# Patient Record
Sex: FEMALE | Race: WHITE | NOT HISPANIC OR LATINO | Employment: OTHER | URBAN - METROPOLITAN AREA
[De-identification: names, ages, dates, MRNs, and addresses within clinical notes are randomized per-mention and may not be internally consistent; named-entity substitution may affect disease eponyms.]

---

## 2023-02-08 ENCOUNTER — OFFICE VISIT (OUTPATIENT)
Dept: PODIATRY | Facility: CLINIC | Age: 88
End: 2023-02-08
Payer: COMMERCIAL

## 2023-02-08 VITALS
HEIGHT: 67 IN | DIASTOLIC BLOOD PRESSURE: 84 MMHG | SYSTOLIC BLOOD PRESSURE: 132 MMHG | BODY MASS INDEX: 30.92 KG/M2 | WEIGHT: 197 LBS | RESPIRATION RATE: 18 BRPM | HEART RATE: 76 BPM

## 2023-02-08 DIAGNOSIS — L03.031 SUBUNGUAL ABSCESS OF TOE, RIGHT: Primary | ICD-10-CM

## 2023-02-08 DIAGNOSIS — L03.031 CELLULITIS OF GREAT TOE OF RIGHT FOOT: ICD-10-CM

## 2023-02-08 PROCEDURE — 99203 PR OFFICE/OUTPT VISIT, NEW, LEVL III, 30-44 MIN: ICD-10-PCS | Mod: S$GLB,,, | Performed by: PODIATRIST

## 2023-02-08 PROCEDURE — 99999 PR PBB SHADOW E&M-NEW PATIENT-LVL IV: CPT | Mod: PBBFAC,,, | Performed by: PODIATRIST

## 2023-02-08 PROCEDURE — 99203 OFFICE O/P NEW LOW 30 MIN: CPT | Mod: S$GLB,,, | Performed by: PODIATRIST

## 2023-02-08 PROCEDURE — 99999 PR PBB SHADOW E&M-NEW PATIENT-LVL IV: ICD-10-PCS | Mod: PBBFAC,,, | Performed by: PODIATRIST

## 2023-02-08 RX ORDER — LANOLIN ALCOHOL/MO/W.PET/CERES
1000 CREAM (GRAM) TOPICAL DAILY
COMMUNITY
Start: 2022-10-05

## 2023-02-08 RX ORDER — HYDROCHLOROTHIAZIDE 12.5 MG/1
12.5 TABLET ORAL DAILY
COMMUNITY
Start: 2022-11-21

## 2023-02-08 RX ORDER — OLMESARTAN MEDOXOMIL 40 MG/1
40 TABLET ORAL DAILY
COMMUNITY
Start: 2023-01-27

## 2023-02-08 RX ORDER — ATORVASTATIN CALCIUM 40 MG/1
40 TABLET, FILM COATED ORAL DAILY
COMMUNITY
Start: 2023-01-27

## 2023-02-08 RX ORDER — CHOLECALCIFEROL (VITAMIN D3) 25 MCG
1000 TABLET ORAL DAILY
COMMUNITY

## 2023-02-08 RX ORDER — DOXYCYCLINE 100 MG/1
100 CAPSULE ORAL EVERY 12 HOURS
Qty: 20 CAPSULE | Refills: 0 | Status: SHIPPED | OUTPATIENT
Start: 2023-02-08 | End: 2023-02-09 | Stop reason: ALTCHOICE

## 2023-02-08 RX ORDER — AZITHROMYCIN 250 MG/1
1 TABLET, FILM COATED ORAL DAILY
COMMUNITY
Start: 2023-01-23 | End: 2023-02-15

## 2023-02-08 RX ORDER — METOPROLOL SUCCINATE 25 MG/1
25 TABLET, EXTENDED RELEASE ORAL DAILY
COMMUNITY
Start: 2022-11-18

## 2023-02-08 RX ORDER — PSEUDOEPHED/CODEINE/TRIPROLIDN 30-10-1.25
1 SYRUP ORAL DAILY
COMMUNITY

## 2023-02-08 RX ORDER — NAPROXEN 500 MG/1
500 TABLET ORAL 2 TIMES DAILY PRN
COMMUNITY
Start: 2022-10-05

## 2023-02-09 ENCOUNTER — TELEPHONE (OUTPATIENT)
Dept: PODIATRY | Facility: CLINIC | Age: 88
End: 2023-02-09
Payer: COMMERCIAL

## 2023-02-09 DIAGNOSIS — L03.031 CELLULITIS OF GREAT TOE OF RIGHT FOOT: Primary | ICD-10-CM

## 2023-02-09 RX ORDER — SULFAMETHOXAZOLE AND TRIMETHOPRIM 800; 160 MG/1; MG/1
1 TABLET ORAL 2 TIMES DAILY
Qty: 20 TABLET | Refills: 0 | Status: SHIPPED | OUTPATIENT
Start: 2023-02-09 | End: 2023-02-19

## 2023-02-09 NOTE — TELEPHONE ENCOUNTER
Returned patient call back and left another message stating that antibiotic had been changed and called into pharmacy.

## 2023-02-09 NOTE — PROGRESS NOTES
Subjective:       Patient ID: Tiera Ryan is a 87 y.o. female.    Chief Complaint: Abscess (Toe)  Patient presents with concern regarding infection of the right great toe.  Relates about 3 weeks ago she wore a new pair shoes for 1 day.  She felt pressure on the toenail but really was not painful.  When she removed the shoes and looked at her toe after dinner the nail was discolored, lifted up some swelling and redness of the skin.  She denies any damage or fungal involvement of the nail previously, states this nail the same as her left big toenail.  She denies injury.  Has not really performed any treatment since it occurred, is getting worse.  Sore when in her shoes but not really painful    Past Medical History:   Diagnosis Date    Hypertension      Past Surgical History:   Procedure Laterality Date    TONSILLECTOMY       History reviewed. No pertinent family history.  Social History     Socioeconomic History    Marital status:    Tobacco Use    Smoking status: Never    Smokeless tobacco: Never   Substance and Sexual Activity    Alcohol use: Not Currently    Drug use: Never    Sexual activity: Not Currently       Current Outpatient Medications   Medication Sig Dispense Refill    atorvastatin (LIPITOR) 40 MG tablet Take 40 mg by mouth Daily.      calcium carbonate 650 mg calcium (1,625 mg) tablet Take 1 tablet by mouth once daily.      cyanocobalamin (VITAMIN B-12) 1000 MCG tablet Take 1,000 mcg by mouth Daily.      metoprolol succinate (TOPROL-XL) 25 MG 24 hr tablet Take 25 mg by mouth Daily.      olmesartan (BENICAR) 40 MG tablet Take 40 mg by mouth Daily.      vitamin D (VITAMIN D3) 1000 units Tab Take 1,000 Units by mouth once daily.      azithromycin (Z-YVONNE) 250 MG tablet Take 1 tablet by mouth Daily.      doxycycline (VIBRAMYCIN) 100 MG Cap Take 1 capsule (100 mg total) by mouth every 12 (twelve) hours. for 10 days 20 capsule 0    hydroCHLOROthiazide (HYDRODIURIL) 12.5 MG Tab Take 12.5 mg by mouth  "Daily.      naproxen (NAPROSYN) 500 MG tablet Take 500 mg by mouth 2 (two) times daily as needed.       No current facility-administered medications for this visit.     Review of patient's allergies indicates:   Allergen Reactions    Fluticasone Other (See Comments)     IRRITATES       Review of Systems   HENT:  Negative for congestion.    Respiratory:  Negative for cough.    Cardiovascular:  Negative for leg swelling.   Musculoskeletal:  Negative for gait problem.   All other systems reviewed and are negative.    Objective:      Vitals:    02/08/23 1129   BP: 132/84   Pulse: 76   Resp: 18   Weight: 89.4 kg (197 lb)   Height: 5' 7" (1.702 m)     Physical Exam  Vitals and nursing note reviewed.   Constitutional:       General: She is not in acute distress.     Appearance: Normal appearance.   Cardiovascular:      Pulses:           Dorsalis pedis pulses are 2+ on the right side and 2+ on the left side.        Posterior tibial pulses are 1+ on the right side and 1+ on the left side.   Pulmonary:      Effort: Pulmonary effort is normal.   Musculoskeletal:      Right foot: No deformity.      Left foot: No deformity.   Feet:      Right foot:      Skin integrity: Skin breakdown and erythema (Subungual abscess right hallux, positive erythema, calor, dystrophic nail) present.      Toenail Condition: Right toenails are abnormally thick.      Left foot:      Skin integrity: Skin integrity normal.   Skin:     Capillary Refill: Capillary refill takes 2 to 3 seconds.      Findings: Erythema present.      Comments: Cellulitis right hallux   Neurological:      General: No focal deficit present.      Mental Status: She is alert.   Psychiatric:         Mood and Affect: Mood normal.         Behavior: Behavior normal.         Thought Content: Thought content normal.         Judgment: Judgment normal.                      Assessment:       1. Subungual abscess of toe, right    2. Cellulitis of great toe of right foot        Plan:     "     Reviewed with patient need to start oral antibiotic due to cellulitis of the toe.  We discussed abscess under the nail which commonly develops due to repetitive pressure on the toenail.  Advised patient the nail may have been slightly damaged her lose, repetitive pressure causes damage to the nail bed, fluid starts leaking from skin break and develops an abscess which can not during.  Explained infection commonly spreads to the surrounding skin  Was able to debride good portion of the nail which is detached with a significant amount of maceration.  No purulence drainage but underlying skin was so moist it was difficult to identify location of abscess.  Nail was still intact on the medial aspect and under the cuticle  Area was cleansed with wound , iodine, gauze and Coban lightly applied  Showed patient how to apply same bandage daily, samples dispensed  She is to apply this bandage after soaking warm water and Epson salt for about 10-15 minutes, dry well, then apply iodine, gauze and light Coban.  In the evening she can remove the dressing allow the area to air out as long as a clean environment comfortable.  Can wear a clean cotton sock to bed only to keep it dry but covered  Explained needs to be covered when in shoe during the day  Reviewed progressing signs of infection to monitor for  Started on doxycycline, discussed antibiotic, start taken today as directed  Patient was in understanding and agreement with treatment plan.  I counseled the patient on their conditions, implications and medical management.  Instructed patient/family to contact the office with any changes, questions, concerns, worsening of symptoms.   Total face to face time 30 minutes, exam, assessment, treatment, discussion, additional time for review of chart prior to and following appointment and visit documentation, consultation and coordination of care.    Follow up 1 week    This note was created using [employer] voice recognition  software that occasionally misinterpreted phrases or words.

## 2023-02-09 NOTE — TELEPHONE ENCOUNTER
LVM regarding change in antibiotic therapy.----- Message from Mellisa Guerrero DPM sent at 2/9/2023  3:10 PM CST -----  I called in Bactrim, start it tomorrow, thank you    ----- Message -----  From: Sharron Goyal LPN  Sent: 2/9/2023   1:48 PM CST  To: Mellisa Guerrero DPM    Please advise.    Thank  you.  ----- Message -----  From: Seth Jc  Sent: 2/9/2023   1:45 PM CST  To: [facility] Staff    Who Called: patient         What is the reqeust in detail: Requesting call back to discuss new prescription,  pt can't take bc pt felt bad, got diarrhea, lots of mucus in throat and nose and dizzy, she had 2 pills one last night and one this morning.      doxycycline (VIBRAMYCIN) 100 MG Cap 20 capsule 0 2/8/2023 2/18/2023 No  Sig - Route: Take 1 capsule (100 mg total) by mouth every 12 (twelve) hours. for 10 days - Oral  Sent to pharmacy as: doxycycline (VIBRAMYCIN) 100 MG Cap  Class: Normal        Can the clinic reply by [facility]? no         Best Call Back Number: [phone]            Additional Information:

## 2023-02-15 ENCOUNTER — OFFICE VISIT (OUTPATIENT)
Dept: PODIATRY | Facility: CLINIC | Age: 88
End: 2023-02-15
Payer: COMMERCIAL

## 2023-02-15 VITALS
HEIGHT: 67 IN | HEART RATE: 75 BPM | DIASTOLIC BLOOD PRESSURE: 74 MMHG | BODY MASS INDEX: 30.92 KG/M2 | WEIGHT: 197 LBS | RESPIRATION RATE: 16 BRPM | SYSTOLIC BLOOD PRESSURE: 116 MMHG

## 2023-02-15 DIAGNOSIS — L03.031 CELLULITIS OF GREAT TOE OF RIGHT FOOT: Primary | ICD-10-CM

## 2023-02-15 DIAGNOSIS — L03.031 SUBUNGUAL ABSCESS OF TOE, RIGHT: ICD-10-CM

## 2023-02-15 PROCEDURE — 99999 PR PBB SHADOW E&M-EST. PATIENT-LVL III: CPT | Mod: PBBFAC,,, | Performed by: PODIATRIST

## 2023-02-15 PROCEDURE — 99213 PR OFFICE/OUTPT VISIT, EST, LEVL III, 20-29 MIN: ICD-10-PCS | Mod: S$GLB,,, | Performed by: PODIATRIST

## 2023-02-15 PROCEDURE — 99213 OFFICE O/P EST LOW 20 MIN: CPT | Mod: S$GLB,,, | Performed by: PODIATRIST

## 2023-02-15 PROCEDURE — 99999 PR PBB SHADOW E&M-EST. PATIENT-LVL III: ICD-10-PCS | Mod: PBBFAC,,, | Performed by: PODIATRIST

## 2023-02-15 NOTE — PROGRESS NOTES
Subjective:       Patient ID: Tiera Ryan is a 87 y.o. female.    Chief Complaint: Follow-up and Abscess  Patient presents for follow-up cellulitis due to subungual abscess right great toe.  Patient relates compliance with daily soaking and applying iodine.  Does relate area is improved, drier, however there is a soft at the cuticle which is tender and can only wear 1 pair of wide shoes.  Denies drainage, redness and swelling have improved, no pain unless with pressure along the cuticle       Past Medical History:   Diagnosis Date    Hypertension      Past Surgical History:   Procedure Laterality Date    TONSILLECTOMY       History reviewed. No pertinent family history.  Social History     Socioeconomic History    Marital status:    Tobacco Use    Smoking status: Never    Smokeless tobacco: Never   Substance and Sexual Activity    Alcohol use: Not Currently    Drug use: Never    Sexual activity: Not Currently       Current Outpatient Medications   Medication Sig Dispense Refill    atorvastatin (LIPITOR) 40 MG tablet Take 40 mg by mouth Daily.      calcium carbonate 650 mg calcium (1,625 mg) tablet Take 1 tablet by mouth once daily.      cyanocobalamin (VITAMIN B-12) 1000 MCG tablet Take 1,000 mcg by mouth Daily.      hydroCHLOROthiazide (HYDRODIURIL) 12.5 MG Tab Take 12.5 mg by mouth Daily.      metoprolol succinate (TOPROL-XL) 25 MG 24 hr tablet Take 25 mg by mouth Daily.      naproxen (NAPROSYN) 500 MG tablet Take 500 mg by mouth 2 (two) times daily as needed.      olmesartan (BENICAR) 40 MG tablet Take 40 mg by mouth Daily.      sulfamethoxazole-trimethoprim 800-160mg (BACTRIM DS) 800-160 mg Tab Take 1 tablet by mouth 2 (two) times daily. for 10 days 20 tablet 0    vitamin D (VITAMIN D3) 1000 units Tab Take 1,000 Units by mouth once daily.       No current facility-administered medications for this visit.     Review of patient's allergies indicates:   Allergen Reactions    Doxycycline hcl Diarrhea      "dizzy    Fluticasone Other (See Comments)     IRRITATES       Review of Systems   HENT:  Negative for congestion.    Respiratory:  Negative for cough.    Cardiovascular:  Negative for leg swelling.   Musculoskeletal:  Negative for gait problem.   All other systems reviewed and are negative.    Objective:      Vitals:    02/15/23 0921   BP: 116/74   Pulse: 75   Resp: 16   Weight: 89.4 kg (197 lb)   Height: 5' 7" (1.702 m)     Physical Exam  Vitals and nursing note reviewed.   Constitutional:       General: She is not in acute distress.     Appearance: Normal appearance.   Cardiovascular:      Pulses:           Dorsalis pedis pulses are 2+ on the right side and 2+ on the left side.        Posterior tibial pulses are 1+ on the right side and 1+ on the left side.   Pulmonary:      Effort: Pulmonary effort is normal.   Musculoskeletal:      Right foot: Bunion present.      Left foot: Bunion present.   Feet:      Right foot:      Skin integrity: Erythema (Significant decrease in erythema and edema with no calor right hallux, cellulitis much improved.  There is a lot of debris on the nail bed, thick, discolored/dried blood with some runny remaining nail along the cuticle and the lateral aspect) present. No skin breakdown (Center cuticle is soft and tender with no skin opening, ulcer or evidence of abscess remaining.  Damage to the nail bed).      Left foot:      Skin integrity: Skin integrity normal.   Skin:     Capillary Refill: Capillary refill takes 2 to 3 seconds.      Comments: Ce   Neurological:      General: No focal deficit present.      Mental Status: She is alert.   Psychiatric:         Mood and Affect: Mood normal.         Behavior: Behavior normal.         Thought Content: Thought content normal.         Judgment: Judgment normal.                      Assessment:       1. Cellulitis of great toe of right foot    2. Subungual abscess of toe, right          Plan:         Thickness nail bed was smoothed/debrided. " Nail bed is dry, lot of the discoloration was removed through debridement  Discussed damage to the nail bed.  Pointed out area which is soft at the cuticle is the location of previous abscess, it will take a while for skin in this location to grow out  Instructed soaking and application of iodine to this location needs to be continued daily until this area is completely dry, firm and pain-free.  At that point she can discontinue both.    Showed how to reduce thickness along the nail bed using nail file or emery board once a week, gently keeping the nail bed smooth, however it will take a long time for this to grow out and it is highly likely a damaged nail may grow back in.  Monitor for any changes.  We reviewed signs of infection to monitor for, contact office with any concerns prior to follow-up  Patient was in understanding and agreement with treatment plan.  I counseled the patient on their conditions, implications and medical management.  Instructed patient/family to contact the office with any changes, questions, concerns, worsening of symptoms.   Total face to face time 20 minutes, exam, assessment, treatment, discussion, additional time for review of chart prior to and following appointment and visit documentation, consultation and coordination of care.    Follow up 6 weeks    This note was created using [employer] voice recognition software that occasionally misinterpreted phrases or words.

## 2023-04-19 ENCOUNTER — OFFICE VISIT (OUTPATIENT)
Dept: PODIATRY | Facility: CLINIC | Age: 88
End: 2023-04-19
Payer: COMMERCIAL

## 2023-04-19 VITALS
HEIGHT: 67 IN | RESPIRATION RATE: 16 BRPM | BODY MASS INDEX: 29.51 KG/M2 | HEART RATE: 67 BPM | SYSTOLIC BLOOD PRESSURE: 151 MMHG | WEIGHT: 188 LBS | DIASTOLIC BLOOD PRESSURE: 78 MMHG

## 2023-04-19 DIAGNOSIS — L60.8 ACQUIRED DYSMORPHIC TOENAIL: Primary | ICD-10-CM

## 2023-04-19 DIAGNOSIS — L03.031 SUBUNGUAL ABSCESS OF TOE, RIGHT: ICD-10-CM

## 2023-04-19 PROCEDURE — 99213 OFFICE O/P EST LOW 20 MIN: CPT | Mod: S$GLB,,, | Performed by: PODIATRIST

## 2023-04-19 PROCEDURE — 99999 PR PBB SHADOW E&M-EST. PATIENT-LVL III: CPT | Mod: PBBFAC,,, | Performed by: PODIATRIST

## 2023-04-19 PROCEDURE — 99999 PR PBB SHADOW E&M-EST. PATIENT-LVL III: ICD-10-PCS | Mod: PBBFAC,,, | Performed by: PODIATRIST

## 2023-04-19 PROCEDURE — 99213 PR OFFICE/OUTPT VISIT, EST, LEVL III, 20-29 MIN: ICD-10-PCS | Mod: S$GLB,,, | Performed by: PODIATRIST

## 2023-04-19 RX ORDER — OLMESARTAN MEDOXOMIL 20 MG/1
20 TABLET ORAL
COMMUNITY
Start: 2023-02-21

## 2023-04-20 NOTE — PROGRESS NOTES
Subjective:       Patient ID: Tiera Ryan is a 88 y.o. female.    Chief Complaint: Follow-up and Abscess  Patient presents for follow-up subungual abscess right great toe involving damage of the nail.  Patient relates she is been soaking still in warm water and Epson salt, part of nail is loose, overall the nail has not grown much, very irregular, dry, no pain.  Has not been applying any topical treatment       Past Medical History:   Diagnosis Date    Hypertension      Past Surgical History:   Procedure Laterality Date    TONSILLECTOMY       History reviewed. No pertinent family history.  Social History     Socioeconomic History    Marital status:    Tobacco Use    Smoking status: Never    Smokeless tobacco: Never   Substance and Sexual Activity    Alcohol use: Not Currently    Drug use: Never    Sexual activity: Not Currently       Current Outpatient Medications   Medication Sig Dispense Refill    atorvastatin (LIPITOR) 40 MG tablet Take 40 mg by mouth Daily.      calcium carbonate 650 mg calcium (1,625 mg) tablet Take 1 tablet by mouth once daily.      cyanocobalamin (VITAMIN B-12) 1000 MCG tablet Take 1,000 mcg by mouth Daily.      hydroCHLOROthiazide (HYDRODIURIL) 12.5 MG Tab Take 12.5 mg by mouth Daily.      metoprolol succinate (TOPROL-XL) 25 MG 24 hr tablet Take 25 mg by mouth Daily.      naproxen (NAPROSYN) 500 MG tablet Take 500 mg by mouth 2 (two) times daily as needed.      olmesartan (BENICAR) 20 MG tablet Take 20 mg by mouth.      olmesartan (BENICAR) 40 MG tablet Take 40 mg by mouth Daily.      vitamin D (VITAMIN D3) 1000 units Tab Take 1,000 Units by mouth once daily.       No current facility-administered medications for this visit.     Review of patient's allergies indicates:   Allergen Reactions    Doxycycline hcl Diarrhea     dizzy    Fluticasone Other (See Comments)     IRRITATES       Review of Systems   HENT:  Negative for congestion.    Respiratory:  Negative for cough.   "  Cardiovascular:  Negative for leg swelling.   Musculoskeletal:  Negative for gait problem.   All other systems reviewed and are negative.    Objective:      Vitals:    04/19/23 1210   BP: (!) 151/78   Pulse: 67   Resp: 16   Weight: 85.3 kg (188 lb)   Height: 5' 7" (1.702 m)     Physical Exam  Vitals and nursing note reviewed.   Constitutional:       General: She is not in acute distress.     Appearance: Normal appearance.   Cardiovascular:      Pulses:           Dorsalis pedis pulses are 2+ on the right side and 2+ on the left side.        Posterior tibial pulses are 1+ on the right side and 1+ on the left side.   Pulmonary:      Effort: Pulmonary effort is normal.   Musculoskeletal:      Right foot: Bunion present.      Left foot: Bunion present.   Feet:      Right foot:      Skin integrity: No erythema (Acquired dystrophic nail right hallux secondary to subungual abscess.  Medial portion is lose, lateral aspect is still attached, center nail bed pink, dry, irregular.  All evidence of infection resolved).      Toenail Condition: Right toenails are abnormally thick. Fungal disease present.     Left foot:      Skin integrity: Skin integrity normal.   Skin:     Capillary Refill: Capillary refill takes 2 to 3 seconds.   Neurological:      Mental Status: She is alert.   Psychiatric:         Mood and Affect: Mood normal.         Behavior: Behavior normal.         Thought Content: Thought content normal.         Judgment: Judgment normal.                    Assessment:       1. Acquired dysmorphic toenail    2. Subungual abscess of toe, right          Plan:         Discussed damage to the nail and nail bed secondary to previous infection.  We reviewed signs of infection to monitor for.  Reassured patient areas completely free of infection at this time, as long as the nail is kept short, clean and well maintained should not have any further problems regarding infection, however the nail is susceptible to fungal " involvement.  There is some discoloration indicating fungus in the center, this is very common due to damage of the nail.  Showed patient how to reduce thickness of the nail, loose nail was debrided today, clean, dry with no complications at this time.  Advised patient she can discontinue soaking, start applying Vicks vapor rub the nail and nail bed in the evening, rub in a small amount make sure it is thoroughly absorbed into the skin and dry before applying socks and shoes advised patient it will take about 3 months for the nail to start growing out, keep it trimmed short, flat, smooth, contact the office with any changes or concerns  Patient was in understanding and agreement with treatment plan.  I counseled the patient on their conditions, implications and medical management.  Instructed patient to contact the office with any changes, questions, concerns, worsening of symptoms.   Total face to face time 20 minutes, exam, assessment, treatment, discussion, additional time for review of chart prior to and following appointment and visit documentation, consultation and coordination of care.    Follow up as needed    This note was created using [employer] voice recognition software that occasionally misinterpreted phrases or words.

## 2025-05-01 ENCOUNTER — TELEPHONE (OUTPATIENT)
Dept: FAMILY MEDICINE | Facility: CLINIC | Age: 89
End: 2025-05-01
Payer: COMMERCIAL

## 2025-05-01 NOTE — TELEPHONE ENCOUNTER
----- Message from Isamar sent at 4/30/2025  5:18 PM CDT -----  :  Appointment RequestName of Caller:Pt's daughter Amber When is the first available appointment?No accessSymptoms:[facility] Would the patient rather a call back or a response via [facility]? Call back Best Call Back Number: [phone] Information: Pt states she is trying to get established with the provider and the pt would like a call back as soon as possible to get the next soonest available appointment.